# Patient Record
Sex: MALE | Race: WHITE | NOT HISPANIC OR LATINO | Employment: OTHER | URBAN - METROPOLITAN AREA
[De-identification: names, ages, dates, MRNs, and addresses within clinical notes are randomized per-mention and may not be internally consistent; named-entity substitution may affect disease eponyms.]

---

## 2024-10-24 ENCOUNTER — OFFICE VISIT (OUTPATIENT)
Dept: FAMILY MEDICINE CLINIC | Facility: CLINIC | Age: 79
End: 2024-10-24
Payer: MEDICARE

## 2024-10-24 VITALS
HEIGHT: 73 IN | TEMPERATURE: 98 F | BODY MASS INDEX: 30.46 KG/M2 | RESPIRATION RATE: 20 BRPM | WEIGHT: 229.8 LBS | SYSTOLIC BLOOD PRESSURE: 118 MMHG | HEART RATE: 68 BPM | OXYGEN SATURATION: 99 % | DIASTOLIC BLOOD PRESSURE: 60 MMHG

## 2024-10-24 DIAGNOSIS — F51.04 CHRONIC INSOMNIA: ICD-10-CM

## 2024-10-24 DIAGNOSIS — I42.9 CARDIOMYOPATHY, UNSPECIFIED TYPE: ICD-10-CM

## 2024-10-24 DIAGNOSIS — G89.29 CHRONIC PAIN OF BOTH SHOULDERS: ICD-10-CM

## 2024-10-24 DIAGNOSIS — M25.511 CHRONIC PAIN OF BOTH SHOULDERS: ICD-10-CM

## 2024-10-24 DIAGNOSIS — I48.11 LONGSTANDING PERSISTENT ATRIAL FIBRILLATION: ICD-10-CM

## 2024-10-24 DIAGNOSIS — R35.1 BPH ASSOCIATED WITH NOCTURIA: Primary | ICD-10-CM

## 2024-10-24 DIAGNOSIS — M25.512 CHRONIC PAIN OF BOTH SHOULDERS: ICD-10-CM

## 2024-10-24 DIAGNOSIS — E78.5 HYPERLIPIDEMIA, UNSPECIFIED HYPERLIPIDEMIA TYPE: ICD-10-CM

## 2024-10-24 DIAGNOSIS — N40.1 BPH ASSOCIATED WITH NOCTURIA: Primary | ICD-10-CM

## 2024-10-24 PROBLEM — R97.20 BPH WITH ELEVATED PSA AND LOWER URINARY TRACT SYMPTOMS: Status: ACTIVE | Noted: 2024-10-24

## 2024-10-24 PROCEDURE — G2211 COMPLEX E/M VISIT ADD ON: HCPCS | Performed by: FAMILY MEDICINE

## 2024-10-24 PROCEDURE — 99205 OFFICE O/P NEW HI 60 MIN: CPT | Performed by: FAMILY MEDICINE

## 2024-10-24 RX ORDER — ATORVASTATIN CALCIUM 40 MG/1
1 TABLET, FILM COATED ORAL DAILY
COMMUNITY
Start: 2024-06-01

## 2024-10-24 RX ORDER — MIRTAZAPINE 15 MG/1
15 TABLET, ORALLY DISINTEGRATING ORAL NIGHTLY
Qty: 30 TABLET | Refills: 5 | Status: SHIPPED | OUTPATIENT
Start: 2024-10-24

## 2024-10-24 RX ORDER — TAMSULOSIN HYDROCHLORIDE 0.4 MG/1
1 CAPSULE ORAL DAILY
Qty: 30 CAPSULE | Refills: 11 | Status: SHIPPED | OUTPATIENT
Start: 2024-10-24

## 2024-10-24 RX ORDER — HYDROCODONE BITARTRATE AND ACETAMINOPHEN 5; 325 MG/1; MG/1
TABLET ORAL
COMMUNITY
Start: 2024-05-29 | End: 2024-10-24

## 2024-10-24 RX ORDER — TRAZODONE HYDROCHLORIDE 50 MG/1
100 TABLET, FILM COATED ORAL NIGHTLY
COMMUNITY
End: 2024-10-24 | Stop reason: SDUPTHER

## 2024-10-24 RX ORDER — BUMETANIDE 1 MG/1
1 TABLET ORAL 2 TIMES DAILY
COMMUNITY
End: 2024-10-24 | Stop reason: SDUPTHER

## 2024-10-24 RX ORDER — AMIODARONE HYDROCHLORIDE 200 MG/1
200 TABLET ORAL 2 TIMES DAILY
COMMUNITY
End: 2024-10-24

## 2024-10-24 RX ORDER — BUMETANIDE 1 MG/1
2 TABLET ORAL 2 TIMES DAILY PRN
COMMUNITY
Start: 2024-09-09 | End: 2024-10-24

## 2024-10-24 RX ORDER — METOLAZONE 10 MG/1
10 TABLET ORAL DAILY
Qty: 30 TABLET | Refills: 5 | Status: SHIPPED | OUTPATIENT
Start: 2024-10-24

## 2024-10-24 RX ORDER — TAMSULOSIN HYDROCHLORIDE 0.4 MG/1
1 CAPSULE ORAL DAILY
COMMUNITY
End: 2024-10-24 | Stop reason: SDUPTHER

## 2024-10-24 RX ORDER — TRAZODONE HYDROCHLORIDE 50 MG/1
100 TABLET, FILM COATED ORAL NIGHTLY
Qty: 60 TABLET | Refills: 5 | Status: SHIPPED | OUTPATIENT
Start: 2024-10-24

## 2024-10-24 RX ORDER — POTASSIUM CHLORIDE 1500 MG/1
20 TABLET, EXTENDED RELEASE ORAL DAILY
Qty: 30 TABLET | Refills: 5 | Status: SHIPPED | OUTPATIENT
Start: 2024-10-24

## 2024-10-24 RX ORDER — APIXABAN 5 MG/1
1 TABLET, FILM COATED ORAL 2 TIMES DAILY
COMMUNITY
Start: 2024-06-01

## 2024-10-24 RX ORDER — POTASSIUM CHLORIDE 1500 MG/1
20 TABLET, EXTENDED RELEASE ORAL DAILY
COMMUNITY
End: 2024-10-24 | Stop reason: SDUPTHER

## 2024-10-24 RX ORDER — METOLAZONE 10 MG/1
10 TABLET ORAL DAILY
COMMUNITY
End: 2024-10-24 | Stop reason: SDUPTHER

## 2024-10-24 RX ORDER — MIRTAZAPINE 15 MG/1
15 TABLET, ORALLY DISINTEGRATING ORAL NIGHTLY
COMMUNITY
End: 2024-10-24 | Stop reason: SDUPTHER

## 2024-10-24 RX ORDER — BUMETANIDE 1 MG/1
1 TABLET ORAL 2 TIMES DAILY
Qty: 60 TABLET | Refills: 5 | Status: SHIPPED | OUTPATIENT
Start: 2024-10-24

## 2024-10-24 NOTE — ASSESSMENT & PLAN NOTE
Longstanding.  Stable with tamsulosin.  Much of his nocturia is due to late evening use of diuretics.  Nocturia interferes with sleep.  We discussed setting reminders and his phone so that he may use diuretics in the morning

## 2024-10-24 NOTE — ASSESSMENT & PLAN NOTE
Poorly controlled.  Long history of poor sleep habits.  I recommend he begin taking his mirtazapine and trazodone at 10 PM with a goal of being in bed and falling asleep by 12 PM.  This should also reduce the morning grogginess the medicines because

## 2024-10-24 NOTE — PROGRESS NOTES
"Chief Complaint   Patient presents with    NP / establish PCP        Subjective      Raphael Hernandez is a 78 y.o. who presents for Chronic Care and to establish care.  Chronic conditions that I will manage her comanage are listed in his assessment and plan.      Patient has been erroneously marked as diabetic. Based on the available clinical information, he does not have diabetes and should therefore be excluded from diabetic health maintenance and quality measures for the remainder of the reporting period.    The following portions of the patient's history were reviewed and updated as appropriate: allergies, current medications, past family history, past medical history, past social history, past surgical history, and problem list.    Review of Systems    Objective   Vital Signs:  /60   Pulse 68   Temp 98 °F (36.7 °C)   Resp 20   Ht 185.4 cm (73\")   Wt 104 kg (229 lb 12.8 oz)   SpO2 99%   BMI 30.32 kg/m²     BMI is >= 30 and <35. (Class 1 Obesity). The following options were offered after discussion;: Information on healthy weight added to patient's after visit summary.        Physical Exam  Constitutional:       Appearance: Normal appearance.      Comments: Patient is in a wheelchair   HENT:      Head: Normocephalic.      Right Ear: External ear normal.      Left Ear: External ear normal.   Cardiovascular:      Rate and Rhythm: Normal rate. Rhythm irregular.   Pulmonary:      Effort: Pulmonary effort is normal.      Breath sounds: Normal breath sounds.   Musculoskeletal:      Cervical back: Normal range of motion.      Right lower leg: Edema present.      Left lower leg: Edema present.   Neurological:      Mental Status: He is alert and oriented to person, place, and time.          Result Review   The following data was reviewed by: Hung Dick MD on 10/24/2024:    Data reviewed : Labs from September 2024 including CMP and CBC               Assessment and Plan  Diagnoses and all orders for " this visit:    1. BPH associated with nocturia (Primary)  Assessment & Plan:  Longstanding.  Stable with tamsulosin.  Much of his nocturia is due to late evening use of diuretics.  Nocturia interferes with sleep.  We discussed setting reminders and his phone so that he may use diuretics in the morning    Orders:  -     tamsulosin (FLOMAX) 0.4 MG capsule 24 hr capsule; Take 1 capsule by mouth Daily.  Dispense: 30 capsule; Refill: 11    2. Chronic insomnia  Assessment & Plan:  Poorly controlled.  Long history of poor sleep habits.  I recommend he begin taking his mirtazapine and trazodone at 10 PM with a goal of being in bed and falling asleep by 12 PM.  This should also reduce the morning grogginess the medicines because    Orders:  -     mirtazapine (REMERON SOL-TAB) 15 MG disintegrating tablet; Place 1 tablet on the tongue Every Night.  Dispense: 30 tablet; Refill: 5  -     traZODone (DESYREL) 50 MG tablet; Take 2 tablets by mouth Every Night.  Dispense: 60 tablet; Refill: 5    3. Hyperlipidemia, unspecified hyperlipidemia type  Assessment & Plan:   Stable.  Continue lifetime statin therapy      4. Cardiomyopathy, unspecified type  Assessment & Plan:  Stable.  Continue regular cardiology follow-up with Dr. Oconnor.  Last EF 35%.  Currently not compliant with diuretic regimen which she admits sometimes forgetting to take altogether or just taking an evening dose of Bumex which contributes to nocturia.  We discussed using a smart phone to set reminders        Orders:  -     bumetanide (BUMEX) 1 MG tablet; Take 1 tablet by mouth 2 (Two) Times a Day.  Dispense: 60 tablet; Refill: 5  -     metOLazone (ZAROXOLYN) 10 MG tablet; Take 1 tablet by mouth Daily.  Dispense: 30 tablet; Refill: 5  -     potassium chloride (KLOR-CON M20) 20 MEQ CR tablet; Take 1 tablet by mouth Daily.  Dispense: 30 tablet; Refill: 5    5. Longstanding persistent atrial fibrillation  Assessment & Plan:  Rate controlled in office today.  Continue  management through local cardiology, Dr. Shamar Oconnor.      6. Chronic pain of both shoulders  Assessment & Plan:  Stable.  Continue as needed analgesics.  We will contact his mail-order pharmacy to clarify whether he has been using high-dose ibuprofen or acetaminophen for this          I spent 68 minutes caring for Raphael on this date of service. This time includes time spent by me in the following activities:reviewing tests, performing a medically appropriate examination and/or evaluation , counseling and educating the patient/family/caregiver, and documenting information in the medical record    Follow Up  No follow-ups on file.  Patient was given instructions and counseling regarding his condition or for health maintenance advice. Please see specific information pulled into the AVS if appropriate.

## 2024-10-24 NOTE — ASSESSMENT & PLAN NOTE
Rate controlled in office today.  Continue management through local cardiology, Dr. Shamar Oconnor.

## 2024-10-24 NOTE — ASSESSMENT & PLAN NOTE
Stable.  Continue as needed analgesics.  We will contact his mail-order pharmacy to clarify whether he has been using high-dose ibuprofen or acetaminophen for this

## 2024-10-24 NOTE — ASSESSMENT & PLAN NOTE
Stable.  Continue regular cardiology follow-up with Dr. Oconnor.  Last EF 35%.  Currently not compliant with diuretic regimen which she admits sometimes forgetting to take altogether or just taking an evening dose of Bumex which contributes to nocturia.  We discussed using a smart phone to set reminders

## 2024-12-10 RX ORDER — ATORVASTATIN CALCIUM 40 MG/1
40 TABLET, FILM COATED ORAL DAILY
Qty: 90 TABLET | Refills: 3 | Status: SHIPPED | OUTPATIENT
Start: 2024-12-10

## 2024-12-10 NOTE — TELEPHONE ENCOUNTER
Caller: Raphael Hernandez    Relationship: Self    Best call back numbe     Requested Prescriptions:   Requested Prescriptions     Pending Prescriptions Disp Refills    atorvastatin (LIPITOR) 40 MG tablet 90 tablet      Sig: Take 1 tablet by mouth Daily.        Pharmacy where request should be sent: [facility] - [facility] [address] - [phone]  - [phone] FX     Last office visit with prescribing clinician: 10/24/2024   Last telemedicine visit with prescribing clinician: Visit date not found   Next office visit with prescribing clinician: Visit date not found     Additional details provided by patient: PATIENT ADVISED THAT LUIS TORRES IS NEEDING A NEW PRESCRIPTION AS THIS WAS WRITTEN BY HIS PREVIOUS PROVIDER; HE IS NOT OUT OF MEDICATION    Does the patient have less than a 3 day supply:  [] Yes  [x] No    Would you like a call back once the refill request has been completed: [] Yes [] No    If the office needs to give you a call back, can they leave a voicemail: [] Yes [] No    Kiersten Simpson Rep   12/10/24 11:15 EST

## 2025-01-02 DIAGNOSIS — I48.11 LONGSTANDING PERSISTENT ATRIAL FIBRILLATION: Primary | ICD-10-CM

## 2025-01-02 NOTE — TELEPHONE ENCOUNTER
Caller: Pedro Hernandezdanay CORTES    Relationship: Self    Best call back number: [phone]     Requested Prescriptions:   Requested Prescriptions     Pending Prescriptions Disp Refills    Eliquis 5 MG tablet tablet 60 tablet      Sig: Take 1 tablet by mouth 2 (Two) Times a Day.        Pharm[facility] where request should be sent[facility] [place] [address] [facility] [phone]  - [phone] FX     Last office visit with prescribing clinician: 10/24/2024   Last telemedicine visit with prescribing clinician: Visit date not found   Next office visit with prescribing clinician: Visit date not found     Additional details provided by patient: PATIENT HAS 5 DAYS LEFT. HE WOULD LIKE A 90 DAY SUPPLY IF POSSIBLE.     Does the patient have less than a 3 day supply:  [] Yes  [x] No    Would you like a call back once the refill request has been completed: [] Yes [x] No    If the office needs to give you a call back, can they leave a voicemail: [] Yes [x] No    Kiersten Rosenberg Rep   01/02/25 16:00 EST

## 2025-01-03 RX ORDER — APIXABAN 5 MG/1
5 TABLET, FILM COATED ORAL 2 TIMES DAILY
Qty: 60 TABLET | Refills: 4 | Status: SHIPPED | OUTPATIENT
Start: 2025-01-03

## 2025-01-07 ENCOUNTER — TELEPHONE (OUTPATIENT)
Dept: FAMILY MEDICINE CLINIC | Facility: CLINIC | Age: 80
End: 2025-01-07
Payer: MEDICARE

## 2025-03-04 ENCOUNTER — OFFICE VISIT (OUTPATIENT)
Dept: FAMILY MEDICINE CLINIC | Facility: CLINIC | Age: 80
End: 2025-03-04
Payer: MEDICARE

## 2025-03-04 VITALS
WEIGHT: 234 LBS | RESPIRATION RATE: 18 BRPM | HEIGHT: 73 IN | BODY MASS INDEX: 31.01 KG/M2 | TEMPERATURE: 97.5 F | SYSTOLIC BLOOD PRESSURE: 118 MMHG | OXYGEN SATURATION: 98 % | HEART RATE: 82 BPM | DIASTOLIC BLOOD PRESSURE: 62 MMHG

## 2025-03-04 DIAGNOSIS — F51.04 CHRONIC INSOMNIA: ICD-10-CM

## 2025-03-04 DIAGNOSIS — I89.0 LYMPHEDEMA DUE TO VENOUS DISEASE: ICD-10-CM

## 2025-03-04 DIAGNOSIS — I99.9 LYMPHEDEMA DUE TO VENOUS DISEASE: ICD-10-CM

## 2025-03-04 DIAGNOSIS — I87.2 CHRONIC VENOUS STASIS DERMATITIS: Primary | ICD-10-CM

## 2025-03-04 DIAGNOSIS — Q80.9 ICHTHYOSIS: ICD-10-CM

## 2025-03-04 RX ORDER — AMMONIUM LACTATE 12 G/100G
1 CREAM TOPICAL AS NEEDED
Qty: 385 G | Refills: 0 | Status: SHIPPED | OUTPATIENT
Start: 2025-03-04

## 2025-03-04 RX ORDER — SPIRONOLACTONE 25 MG/1
1 TABLET ORAL DAILY
COMMUNITY
Start: 2025-02-25

## 2025-03-04 NOTE — PROGRESS NOTES
"Chief Complaint   Patient presents with    Skin Issue     Legs have been flaking, x4-5mo. Tried several different topicals.        Subjective      Raphaelcortez Hernandez is a 79 y.o. who presents for ongoing skin issues of the lower extremities for the last 4 to 5 months.  Patient reports a skin becomes very dry and flaky.  Large flakes of skin will fall off his legs and get into his carpet and eddie.  He will use moisturizing creams or lotions with improvement but when he stops using them since the dry skin will return.    Objective   Vital Signs:  /62   Pulse 82   Temp 97.5 °F (36.4 °C)   Resp 18   Ht 185.4 cm (73\")   Wt 106 kg (234 lb)   SpO2 98%   BMI 30.87 kg/m²     Physical Exam  Vitals reviewed.   Skin:     Comments: Bilateral lower extremities have pink nodular skin from the mid shin down to the feet.  Skin is edematous.  On the lateral aspects of the lower legs there is dry cracked skin that is superficial and adherent.  He does not have any open wounds          Result Review                     Assessment and Plan  Diagnoses and all orders for this visit:    1. Chronic venous stasis dermatitis (Primary)  -     ammonium lactate (AMLACTIN) 12 % cream; Apply 1 Application topically to the appropriate area as directed As Needed for Dry Skin.  Dispense: 385 g; Refill: 0    2. Ichthyosis  -     ammonium lactate (AMLACTIN) 12 % cream; Apply 1 Application topically to the appropriate area as directed As Needed for Dry Skin.  Dispense: 385 g; Refill: 0    3. Lymphedema due to venous disease  -     Ambulatory Referral to Physical Therapy for Evaluation & Treatment    4. Chronic insomnia    Plan  1.  Continue to use moisturizing cream on bilateral lower extremities.  2.  Use ammonium lactate on thickened dry flaky skin  3.  Refer to [facility] physical therapy for lymphedema treatment  4.  It was explained to the patient that this will be a chronic and recurring condition as long as the " lymphedema persists  5.  Patient brought up chronic insomnia estimating 4 hours of sleep per night despite use of mirtazapine and trazodone on a limited as needed basis.  We reviewed sleep hygiene        Follow Up  No follow-ups on file.  Patient was given instructions and counseling regarding his condition or for health maintenance advice. Please see specific information pulled into the AVS if appropriate.

## 2025-03-19 ENCOUNTER — TELEPHONE (OUTPATIENT)
Dept: FAMILY MEDICINE CLINIC | Facility: CLINIC | Age: 80
End: 2025-03-19
Payer: MEDICARE

## 2025-03-19 NOTE — TELEPHONE ENCOUNTER
Called pt for more information to see why an antibiotic cream is now being requested. He stated, that he has struck the back of his LLE on his wheelchair, which he does quite often and it's cracked open and draining a clear liquid. Since this always happened often, Dr. Rinaldi gave him a tube of this to keep and use for a few days when it would happen.

## 2025-03-19 NOTE — TELEPHONE ENCOUNTER
Caller: Raphael Hernandez    Relationship: Self    Best call back number: [phone]     What medication are you requesting: MUPIROCIN OINTMENT 2%    What are your current symptoms: SKIN CONDITION ON LEG    How long have you been experiencing symptoms: 6 MONTHS    Have you had these symptoms before:    [x] Yes  [] No    Have you been treated for these symptoms before:   [x] Yes  [] No    If a prescription is needed, what is your preferred pharmacy and phone number:  [facility]    Additional notes: PATIENT WAS TREATED BY DOCTOR OLIVO FOR THE SKIN CONDITION.

## 2025-03-20 RX ORDER — MUPIROCIN 20 MG/G
1 OINTMENT TOPICAL 2 TIMES DAILY
Qty: 15 G | Refills: 0 | Status: SHIPPED | OUTPATIENT
Start: 2025-03-20 | End: 2025-03-25

## 2025-03-24 DIAGNOSIS — I48.11 LONGSTANDING PERSISTENT ATRIAL FIBRILLATION: ICD-10-CM

## 2025-03-24 RX ORDER — APIXABAN 5 MG/1
5 TABLET, FILM COATED ORAL 2 TIMES DAILY
Qty: 60 TABLET | Refills: 4 | Status: SHIPPED | OUTPATIENT
Start: 2025-03-24

## 2025-05-23 ENCOUNTER — OFFICE VISIT (OUTPATIENT)
Dept: FAMILY MEDICINE CLINIC | Facility: CLINIC | Age: 80
End: 2025-05-23
Payer: MEDICARE

## 2025-05-23 VITALS
HEIGHT: 73 IN | HEART RATE: 70 BPM | DIASTOLIC BLOOD PRESSURE: 64 MMHG | TEMPERATURE: 97.7 F | BODY MASS INDEX: 30.32 KG/M2 | WEIGHT: 228.8 LBS | SYSTOLIC BLOOD PRESSURE: 100 MMHG | RESPIRATION RATE: 14 BRPM | OXYGEN SATURATION: 93 %

## 2025-05-23 DIAGNOSIS — I89.0 LYMPHEDEMA DUE TO VENOUS INSUFFICIENCY: ICD-10-CM

## 2025-05-23 DIAGNOSIS — N40.1 BPH ASSOCIATED WITH NOCTURIA: ICD-10-CM

## 2025-05-23 DIAGNOSIS — R39.15 URINARY URGENCY: ICD-10-CM

## 2025-05-23 DIAGNOSIS — I48.11 LONGSTANDING PERSISTENT ATRIAL FIBRILLATION: ICD-10-CM

## 2025-05-23 DIAGNOSIS — Z00.00 MEDICARE ANNUAL WELLNESS VISIT, SUBSEQUENT: Primary | ICD-10-CM

## 2025-05-23 DIAGNOSIS — E78.5 HYPERLIPIDEMIA, UNSPECIFIED HYPERLIPIDEMIA TYPE: ICD-10-CM

## 2025-05-23 DIAGNOSIS — R35.1 BPH ASSOCIATED WITH NOCTURIA: ICD-10-CM

## 2025-05-23 DIAGNOSIS — L89.159 PRESSURE INJURY OF SKIN OF SACRAL REGION, UNSPECIFIED INJURY STAGE: ICD-10-CM

## 2025-05-23 DIAGNOSIS — I42.9 CARDIOMYOPATHY, UNSPECIFIED TYPE: ICD-10-CM

## 2025-05-23 DIAGNOSIS — Z00.00 ANNUAL PHYSICAL EXAM: ICD-10-CM

## 2025-05-23 DIAGNOSIS — I87.2 LYMPHEDEMA DUE TO VENOUS INSUFFICIENCY: ICD-10-CM

## 2025-05-23 PROBLEM — Z79.899 LONG TERM CURRENT USE OF DIURETIC: Status: ACTIVE | Noted: 2025-05-23

## 2025-05-23 PROCEDURE — 1160F RVW MEDS BY RX/DR IN RCRD: CPT | Performed by: FAMILY MEDICINE

## 2025-05-23 PROCEDURE — 1159F MED LIST DOCD IN RCRD: CPT | Performed by: FAMILY MEDICINE

## 2025-05-23 RX ORDER — OXYBUTYNIN CHLORIDE 10 MG/1
10 TABLET, EXTENDED RELEASE ORAL DAILY
Qty: 30 TABLET | Refills: 5 | Status: SHIPPED | OUTPATIENT
Start: 2025-05-23

## 2025-05-23 RX ORDER — SENNOSIDES 8.6 MG
650 CAPSULE ORAL EVERY 8 HOURS PRN
Qty: 90 TABLET | Refills: 2 | Status: SHIPPED | OUTPATIENT
Start: 2025-05-23

## 2025-05-23 NOTE — ASSESSMENT & PLAN NOTE
Orders:    Comprehensive Metabolic Panel    CBC (No Diff)     -- resume ezetimibe outpatient. Holding inpatient.

## 2025-05-23 NOTE — PROGRESS NOTES
Subjective   The ABCs of the Annual Wellness Visit  Medicare Wellness Visit      Raphael Hernandez is a 79 y.o. patient who presents for a Medicare Wellness Visit.    The following portions of the patient's history were reviewed and   updated as appropriate: allergies, current medications, past family history, past medical history, past social history, past surgical history, and problem list.    Compared to one year ago, the patient's physical   health is the same.  Compared to one year ago, the patient's mental   health is better.    Recent Hospitalizations:  He was not admitted to the hospital during the last year.     Current Medical Providers:  Patient Care Team:  Hung Dick MD as PCP - General (Family Medicine)  Case, Zhen Beaulieu MD as Consulting Physician (Internal Medicine)  Moshe Oconnor MD (Cardiology)    Outpatient Medications Prior to Visit   Medication Sig Dispense Refill    ammonium lactate (AMLACTIN) 12 % cream Apply 1 Application topically to the appropriate area as directed As Needed for Dry Skin. 385 g 0    atorvastatin (LIPITOR) 40 MG tablet Take 1 tablet by mouth Daily. 90 tablet 3    bumetanide (BUMEX) 1 MG tablet Take 1 tablet by mouth 2 (Two) Times a Day. 60 tablet 5    Eliquis 5 MG tablet tablet TAKE ONE TABLET BY MOUTH TWICE DAILY 60 tablet 4    metOLazone (ZAROXOLYN) 10 MG tablet Take 1 tablet by mouth Daily. 30 tablet 5    metoprolol tartrate (LOPRESSOR) 12.5 MG half tablet Take 2 half tablet by mouth 2 (Two) Times a Day.      mirtazapine (REMERON SOL-TAB) 15 MG disintegrating tablet Place 1 tablet on the tongue Every Night. 30 tablet 5    potassium chloride (KLOR-CON M20) 20 MEQ CR tablet Take 1 tablet by mouth Daily. 30 tablet 5    tamsulosin (FLOMAX) 0.4 MG capsule 24 hr capsule Take 1 capsule by mouth Daily. 30 capsule 11    traZODone (DESYREL) 50 MG tablet Take 2 tablets by mouth Every Night. 60 tablet 5    spironolactone (ALDACTONE) 25 MG tablet Take 1 tablet  "by mouth Daily.       No facility-administered medications prior to visit.     No opioid medication identified on active medication list. I have reviewed chart for other potential  high risk medication/s and harmful drug interactions in the elderly.      Aspirin is not on active medication list.  Aspirin use is not indicated based on review of current medical condition/s. Risk of harm outweighs potential benefits.  .    Patient Active Problem List   Diagnosis    Longstanding persistent atrial fibrillation    Cardiomyopathy    Hyperlipidemia    BPH associated with nocturia    Chronic insomnia    Chronic pain of both shoulders    Lymphedema due to venous insufficiency    Long term current use of diuretic     Advance Care Planning Advance Directive is not on file.  ACP discussion was held with the patient during this visit. Patient has an advance directive (not in EMR), copy requested.            Objective   Vitals:    05/23/25 1014   BP: 100/64   Pulse: 70   Resp: 14   Temp: 97.7 °F (36.5 °C)   SpO2: 93%   Weight: 104 kg (228 lb 12.8 oz)   Height: 185.4 cm (73\")   PainSc: 0-No pain       Estimated body mass index is 30.19 kg/m² as calculated from the following:    Height as of this encounter: 185.4 cm (73\").    Weight as of this encounter: 104 kg (228 lb 12.8 oz).                Does the patient have evidence of cognitive impairment? No                                                                                                Health  Risk Assessment    Smoking Status:  Social History     Tobacco Use   Smoking Status Never   Smokeless Tobacco Never     Alcohol Consumption:  Social History     Substance and Sexual Activity   Alcohol Use Yes       Fall Risk Screen  STEADI Fall Risk Assessment was completed, and patient is at HIGH risk for falls. Assessment completed on:5/23/2025    Depression Screening   Little interest or pleasure in doing things? Not at all   Feeling down, depressed, or hopeless? Not at all   PHQ-2 " Total Score 0      Health Habits and Functional and Cognitive Screenin/23/2025    11:33 AM   Functional & Cognitive Status   Do you have difficulty preparing food and eating? No   Do you have difficulty bathing yourself, getting dressed or grooming yourself? No   Do you have difficulty using the toilet? No   Do you have difficulty moving around from place to place? No   Do you have trouble with steps or getting out of a bed or a chair? No   Current Diet Limited Junk Food   Dental Exam Up to date   Eye Exam Up to date   Exercise (times per week) 3 times per week   Current Exercises Include Other   Do you need help using the phone?  No   Are you deaf or do you have serious difficulty hearing?  No   Do you need help to go to places out of walking distance? Yes   Do you need help shopping? No   Do you need help preparing meals?  No   Do you need help with housework?  No   Do you need help with laundry? No   Do you need help taking your medications? No   Do you need help managing money? No   Do you ever drive or ride in a car without wearing a seat belt? No   Have you felt unusual stress, anger or loneliness in the last month? No   Who do you live with? Alone   If you need help, do you have trouble finding someone available to you? No   Have you been bothered in the last four weeks by sexual problems? No   Do you have difficulty concentrating, remembering or making decisions? No           Age-appropriate Screening Schedule:  Refer to the list below for future screening recommendations based on patient's age, sex and/or medical conditions. Orders for these recommended tests are listed in the plan section. The patient has been provided with a written plan.    Health Maintenance List  Health Maintenance   Topic Date Due    LIPID PANEL  Never done    DIABETIC FOOT EXAM  Never done    DIABETIC EYE EXAM  Never done    URINE MICROALBUMIN-CREATININE RATIO (uACR)  Never done    Pneumococcal Vaccine 50+ (1 of 2 - PCV)  "Never done    ZOSTER VACCINE (1 of 2) Never done    RSV Vaccine - Adults (1 - 1-dose 75+ series) Never done    HEMOGLOBIN A1C  Never done    COVID-19 Vaccine (3 - 2024-25 season) 06/06/2025 (Originally 9/1/2024)    INFLUENZA VACCINE  07/01/2025    ANNUAL WELLNESS VISIT  05/23/2026    TDAP/TD VACCINES (2 - Tdap) 06/07/2032    HEPATITIS C SCREENING  Completed                                                                                                                                                CMS Preventative Services Quick Reference  Risk Factors Identified During Encounter  Fall Risk-High or Moderate: Discussed Fall Prevention in the home  Immunizations Discussed/Encouraged: Influenza  Urinary Incontinence:  trial of ditropan  F/U Cardiology every 6-12 months    The above risks/problems have been discussed with the patient.  Pertinent information has been shared with the patient in the After Visit Summary.  An After Visit Summary and PPPS were made available to the patient.    Follow Up:   Next Medicare Wellness visit to be scheduled in 1 year.         Additional E&M Note during same encounter follows:  Patient has additional, significant, and separately identifiable condition(s)/problem(s) that require work above and beyond the Medicare Wellness Visit     Chief Complaint  Medicare Wellness-subsequent    Subjective   HPI  Raphael is also being seen today for an annual adult preventative physical exam.     Review of Systems   HENT:  Positive for voice change.    Cardiovascular:  Positive for leg swelling.   Genitourinary:  Positive for urgency.   Musculoskeletal:  Positive for arthralgias and gait problem.   Skin:  Positive for wound.              Objective   Vital Signs:  /64   Pulse 70   Temp 97.7 °F (36.5 °C)   Resp 14   Ht 185.4 cm (73\")   Wt 104 kg (228 lb 12.8 oz)   SpO2 93%   BMI 30.19 kg/m²   Physical Exam  Constitutional:       General: He is not in acute distress.     Appearance: Normal " appearance. He is not ill-appearing.      Comments: He is in a wheelchair   HENT:      Head: Normocephalic and atraumatic.      Right Ear: Tympanic membrane and ear canal normal.      Left Ear: Tympanic membrane and ear canal normal.      Nose: Nose normal.      Mouth/Throat:      Mouth: Mucous membranes are moist.      Pharynx: Oropharynx is clear. No posterior oropharyngeal erythema.   Eyes:      Extraocular Movements: Extraocular movements intact.      Conjunctiva/sclera: Conjunctivae normal.      Pupils: Pupils are equal, round, and reactive to light.   Cardiovascular:      Rate and Rhythm: Normal rate and regular rhythm.      Pulses: Normal pulses.      Heart sounds: Normal heart sounds. No murmur heard.     Comments: Bilateral lower extremity edema from lymphedema.  Skin is dry and flaky.  Serous weeping from the posterior left leg  Pulmonary:      Effort: Pulmonary effort is normal. No respiratory distress.      Breath sounds: Normal breath sounds.   Abdominal:      General: Abdomen is flat. Bowel sounds are normal.      Palpations: Abdomen is soft.      Tenderness: There is no abdominal tenderness.   Musculoskeletal:         General: Normal range of motion.      Cervical back: Normal range of motion and neck supple. No tenderness.      Right lower leg: Edema present.      Left lower leg: Edema present.   Lymphadenopathy:      Cervical: No cervical adenopathy.   Skin:     General: Skin is warm and dry.      Capillary Refill: Capillary refill takes less than 2 seconds.   Neurological:      General: No focal deficit present.      Mental Status: He is alert and oriented to person, place, and time. Mental status is at baseline.      Cranial Nerves: No cranial nerve deficit.      Sensory: No sensory deficit.      Motor: No weakness.   Psychiatric:         Mood and Affect: Mood normal.         Behavior: Behavior normal.         Thought Content: Thought content normal.         Judgment: Judgment normal.                     Assessment and Plan      Medicare annual wellness visit, subsequent         Annual physical exam         Longstanding persistent atrial fibrillation    Orders:    Comprehensive Metabolic Panel    CBC (No Diff)    Cardiomyopathy, unspecified type          Orders:    Comprehensive Metabolic Panel    CBC (No Diff)    Hyperlipidemia, unspecified hyperlipidemia type       Orders:    Comprehensive Metabolic Panel    Lipid Panel    Lymphedema due to venous insufficiency    Orders:    Ambulatory Referral to Physical Therapy for Evaluation & Treatment    BPH associated with nocturia         Urinary urgency    Orders:    oxybutynin XL (Ditropan XL) 10 MG 24 hr tablet; Take 1 tablet by mouth Daily.    Pressure injury of skin of sacral region, unspecified injury stage    Orders:    Zinc Oxide 12 % cream; Apply 1 Application topically 2 (Two) Times a Day.  Plan  1.  Patient's lymphedema has improved slightly but he will be referred to the lymphedema clinic locally.  2.  Patient has a sacral wound which he declined evaluation for but would prefer like a barrier cream and was given Barse  3.  Patient reports urinary urgency which is very likely contributed to by his multiple diuretics.  Oxybutynin can be used as a trial although I did discuss with the patient whether or not he needs all 3 diuretics.  He has follow-up with cardiology next week to assess.  4.  Patient was encouraged to remain as active as possible  5.  Surveillance labs have been ordered          Follow Up   No follow-ups on file.  Patient was given instructions and counseling regarding his condition or for health maintenance advice. Please see specific information pulled into the AVS if appropriate.

## 2025-05-24 ENCOUNTER — DOCUMENTATION (OUTPATIENT)
Dept: FAMILY MEDICINE CLINIC | Facility: CLINIC | Age: 80
End: 2025-05-24
Payer: MEDICARE

## 2025-05-24 ENCOUNTER — NURSE TRIAGE (OUTPATIENT)
Dept: CALL CENTER | Facility: HOSPITAL | Age: 80
End: 2025-05-24
Payer: MEDICARE

## 2025-05-24 LAB
ALBUMIN SERPL-MCNC: 3.8 G/DL (ref 3.8–4.8)
ALP SERPL-CCNC: 147 IU/L (ref 44–121)
ALT SERPL-CCNC: 12 IU/L (ref 0–44)
AST SERPL-CCNC: 12 IU/L (ref 0–40)
BILIRUB SERPL-MCNC: 0.8 MG/DL (ref 0–1.2)
BUN SERPL-MCNC: 35 MG/DL (ref 8–27)
BUN/CREAT SERPL: 26 (ref 10–24)
CALCIUM SERPL-MCNC: 9.4 MG/DL (ref 8.6–10.2)
CHLORIDE SERPL-SCNC: 84 MMOL/L (ref 96–106)
CHOLEST SERPL-MCNC: 102 MG/DL (ref 100–199)
CO2 SERPL-SCNC: 24 MMOL/L (ref 20–29)
CREAT SERPL-MCNC: 1.37 MG/DL (ref 0.76–1.27)
EGFRCR SERPLBLD CKD-EPI 2021: 52 ML/MIN/1.73
ERYTHROCYTE [DISTWIDTH] IN BLOOD BY AUTOMATED COUNT: 12.5 % (ref 11.6–15.4)
GLOBULIN SER CALC-MCNC: 3 G/DL (ref 1.5–4.5)
GLUCOSE SERPL-MCNC: 632 MG/DL (ref 70–99)
HCT VFR BLD AUTO: 37.9 % (ref 37.5–51)
HDLC SERPL-MCNC: 39 MG/DL
HGB BLD-MCNC: 12.3 G/DL (ref 13–17.7)
LDLC SERPL CALC-MCNC: 43 MG/DL (ref 0–99)
MCH RBC QN AUTO: 31.9 PG (ref 26.6–33)
MCHC RBC AUTO-ENTMCNC: 32.5 G/DL (ref 31.5–35.7)
MCV RBC AUTO: 98 FL (ref 79–97)
PLATELET # BLD AUTO: 235 X10E3/UL (ref 150–450)
POTASSIUM SERPL-SCNC: 4.8 MMOL/L (ref 3.5–5.2)
PROT SERPL-MCNC: 6.8 G/DL (ref 6–8.5)
RBC # BLD AUTO: 3.86 X10E6/UL (ref 4.14–5.8)
SODIUM SERPL-SCNC: 124 MMOL/L (ref 134–144)
TRIGL SERPL-MCNC: 104 MG/DL (ref 0–149)
VLDLC SERPL CALC-MCNC: 20 MG/DL (ref 5–40)
WBC # BLD AUTO: 7.1 X10E3/UL (ref 3.4–10.8)

## 2025-05-24 NOTE — TELEPHONE ENCOUNTER
"Reason for Disposition   Lab or radiology calling with CRITICAL test results    Additional Information   Negative: Lab calling with strep throat test results and triager can call in prescription   Negative: Lab calling with urinalysis test results and triager can call in prescription   Negative: Medication questions   Negative: Medication renewal and refill questions   Negative: Pre-operative or pre-procedural questions   Negative: ED call to PCP (i.e., primary care provider; doctor, NP, or PA)   Negative: Doctor (or NP/PA) call to PCP   Negative: Call about patient who is currently hospitalized    Answer Assessment - Initial Assessment Questions  1. REASON FOR CALL or QUESTION: \"What is your reason for calling today?\" or \"How can I best  help you?\" or \"What question do you have that I can help answer?\"      Critical Lab Value  2. CALLER: Document the source of call. (e.g., laboratory, patient).      Tia, [facility]    Protocols used: PCP Call - No Triage-ADULT-    "

## 2025-05-24 NOTE — TELEPHONE ENCOUNTER
Critical Lab Value of  drawn on 05/23/25 @ 1106 reported by Brielle, [facility];  left message on Dr. Kapil GARCIA with results and call back with any questions.

## 2025-05-29 DIAGNOSIS — I42.9 CARDIOMYOPATHY, UNSPECIFIED TYPE: ICD-10-CM

## 2025-05-29 RX ORDER — POTASSIUM CHLORIDE 1500 MG/1
20 TABLET, EXTENDED RELEASE ORAL DAILY
Qty: 30 TABLET | Refills: 5 | Status: SHIPPED | OUTPATIENT
Start: 2025-05-29

## 2025-05-30 ENCOUNTER — OFFICE VISIT (OUTPATIENT)
Dept: FAMILY MEDICINE CLINIC | Facility: CLINIC | Age: 80
End: 2025-05-30
Payer: MEDICARE

## 2025-05-30 VITALS
HEIGHT: 73 IN | RESPIRATION RATE: 24 BRPM | HEART RATE: 79 BPM | BODY MASS INDEX: 30.19 KG/M2 | TEMPERATURE: 98.6 F | SYSTOLIC BLOOD PRESSURE: 110 MMHG | DIASTOLIC BLOOD PRESSURE: 60 MMHG | OXYGEN SATURATION: 99 %

## 2025-05-30 DIAGNOSIS — E11.65 TYPE 2 DIABETES MELLITUS WITH HYPERGLYCEMIA, WITHOUT LONG-TERM CURRENT USE OF INSULIN: Primary | ICD-10-CM

## 2025-05-30 PROBLEM — Z79.4 TYPE 2 DIABETES MELLITUS WITH HYPERGLYCEMIA, WITH LONG-TERM CURRENT USE OF INSULIN: Status: ACTIVE | Noted: 2025-05-30

## 2025-05-30 LAB
EXPIRATION DATE: ABNORMAL
HBA1C MFR BLD: 13.3 % (ref 4.5–5.7)
Lab: ABNORMAL

## 2025-05-30 RX ORDER — ACARBOSE 25 MG/1
25 TABLET ORAL
Qty: 90 TABLET | Refills: 0 | Status: SHIPPED | OUTPATIENT
Start: 2025-05-30

## 2025-05-30 RX ORDER — ORAL SEMAGLUTIDE 7 MG/1
7 TABLET ORAL DAILY
Qty: 30 TABLET | Refills: 1 | Status: SHIPPED | OUTPATIENT
Start: 2025-05-30

## 2025-05-30 NOTE — ASSESSMENT & PLAN NOTE
Diabetes is newly identified.   Medication changes per orders.  Recommended an ADA diet.  Nutritionist referral.  Diabetes will be reassessed 2 weeks    Patient voiced a great fear of needles and giving himself injections.    Oral hypoglycemic agents will be started and patient's tolerance for medications as well as random glucose will be checked at a 2-week follow-up visit    Patient needs to make significant dietary changes which were discussed.  This includes elimination of sugar sweetened beverages which the patient drinks large quantity of with fruit juices and soft drinks.  He needs to replace snack cakes with fruit or vegetables.  He needs to replace his protein shake with a low carbohydrate alternative.  V8 juice is likely still acceptable.  I did recommend avoidance of any fried or breaded foods.

## 2025-05-30 NOTE — PROGRESS NOTES
"No chief complaint on file.      Subjective      Raphael Hernandez is a 79 y.o. who presents for hyperglycemia with new diagnosis of diabetes.  Patient had a random glucose greater than 600 and has polyuria and polydipsia.  In the past at 1 point he describes being told his sugars were \"borderline\" but then tests supposedly returned to normal.  He has never taken medication or officially been diagnosed with diabetes as far as he is aware    The following portions of the patient's history were reviewed and updated as appropriate: allergies, current medications, past family history, past medical history, past social history, past surgical history, and problem list.    Review of Systems    Objective   Vital Signs:  /60   Pulse 79   Temp 98.6 °F (37 °C)   Resp 24   Ht 185.4 cm (73\")   SpO2 99%   BMI 30.19 kg/m²               Physical Exam  Vitals reviewed.   Constitutional:       Appearance: Normal appearance.   Neurological:      Mental Status: He is alert.          Result Review   The following data was reviewed by: Hung Dick MD on 05/30/2025:  CMP          5/23/2025    11:06   CMP   Glucose 632    BUN 35    Creatinine 1.37    EGFR 52    Sodium 124    Potassium 4.8    Chloride 84    Calcium 9.4    Total Protein 6.8    Albumin 3.8    Globulin 3.0    Total Bilirubin 0.8    Alkaline Phosphatase 147    AST (SGOT) 12    ALT (SGPT) 12    BUN/Creatinine Ratio 26      Most Recent A1C          5/30/2025    12:12   HGBA1C Most Recent   Hemoglobin A1C 13.3                    Assessment and Plan  Diagnoses and all orders for this visit:    1. Type 2 diabetes mellitus with hyperglycemia, without long-term current use of insulin (Primary)  Assessment & Plan:  Diabetes is newly identified.   Medication changes per orders.  Recommended an ADA diet.  Nutritionist referral.  Diabetes will be reassessed 2 weeks    Patient voiced a great fear of needles and giving himself injections.    Oral hypoglycemic agents will " be started and patient's tolerance for medications as well as random glucose will be checked at a 2-week follow-up visit    Patient needs to make significant dietary changes which were discussed.  This includes elimination of sugar sweetened beverages which the patient drinks large quantity of with fruit juices and soft drinks.  He needs to replace snack cakes with fruit or vegetables.  He needs to replace his protein shake with a low carbohydrate alternative.  V8 juice is likely still acceptable.  I did recommend avoidance of any fried or breaded foods.    Orders:  -     POC Glycosylated Hemoglobin (Hb A1C)  -     metFORMIN (GLUCOPHAGE) 500 MG tablet; Take 1 tablet by mouth 2 (Two) Times a Day With Meals.  Dispense: 60 tablet; Refill: 1  -     Semaglutide (Rybelsus) 7 MG tablet; Take 7 mg by mouth Daily.  Dispense: 30 tablet; Refill: 1  -     acarbose (PRECOSE) 25 MG tablet; Take 1 tablet by mouth 3 (Three) Times a Day With Meals.  Dispense: 90 tablet; Refill: 0  -     Ambulatory Referral to Nutrition Services            Follow Up  Return in about 2 weeks (around 6/13/2025).  Patient was given instructions and counseling regarding his condition or for health maintenance advice. Please see specific information pulled into the AVS if appropriate.

## 2025-06-12 ENCOUNTER — OFFICE VISIT (OUTPATIENT)
Dept: FAMILY MEDICINE CLINIC | Facility: CLINIC | Age: 80
End: 2025-06-12
Payer: MEDICARE

## 2025-06-12 VITALS
SYSTOLIC BLOOD PRESSURE: 110 MMHG | HEIGHT: 73 IN | TEMPERATURE: 98.2 F | RESPIRATION RATE: 20 BRPM | WEIGHT: 226.2 LBS | DIASTOLIC BLOOD PRESSURE: 60 MMHG | BODY MASS INDEX: 29.98 KG/M2 | HEART RATE: 80 BPM | OXYGEN SATURATION: 99 %

## 2025-06-12 DIAGNOSIS — E11.65 TYPE 2 DIABETES MELLITUS WITH HYPERGLYCEMIA, WITHOUT LONG-TERM CURRENT USE OF INSULIN: Primary | ICD-10-CM

## 2025-06-12 LAB — GLUCOSE BLDC GLUCOMTR-MCNC: 235 MG/DL (ref 70–130)

## 2025-06-12 RX ORDER — ACARBOSE 50 MG/1
50 TABLET ORAL 2 TIMES DAILY WITH MEALS
Qty: 60 TABLET | Refills: 0 | Status: SHIPPED | OUTPATIENT
Start: 2025-06-12

## 2025-06-12 NOTE — ASSESSMENT & PLAN NOTE
Diabetes control is improving.  Continue medication adjustments.    Metformin 500 mg twice daily will remain unchanged  Rybelsus will be increased to 7 mg after completing his 3 mg trial.  Patient has picked up the prescription  Acarbose will be increased to 50 mg which she will take twice daily with his breakfast shake and his evening meal.    Reassess in 4 weeks

## 2025-06-12 NOTE — PROGRESS NOTES
"Chief Complaint   Patient presents with    Follow-up    Diabetes       Subjective      Raphael Hernandez is a 79 y.o. who presents for diabetes follow-up.  Patient has tolerated metformin 500 mg twice daily, Rybelsus 3 mg daily, acarbose 25 mg which she is taking with his Lohan meal of the day.  He continues a protein shake in the morning    Objective   Vital Signs:  /60   Pulse 80   Temp 98.2 °F (36.8 °C)   Resp 20   Ht 185.4 cm (73\")   Wt 103 kg (226 lb 3.2 oz)   SpO2 99%   BMI 29.84 kg/m²     Physical Exam  Vitals reviewed.   Constitutional:       Appearance: Normal appearance.   Neurological:      Mental Status: He is alert.          Result Review   The following data was reviewed by: Hung Dick MD on 06/12/2025:    Data reviewed : Glucose 235              Assessment and Plan  Diagnoses and all orders for this visit:    1. Type 2 diabetes mellitus with hyperglycemia, without long-term current use of insulin (Primary)  Assessment & Plan:  Diabetes control is improving.  Continue medication adjustments.    Metformin 500 mg twice daily will remain unchanged  Rybelsus will be increased to 7 mg after completing his 3 mg trial.  Patient has picked up the prescription  Acarbose will be increased to 50 mg which she will take twice daily with his breakfast shake and his evening meal.    Reassess in 4 weeks    Orders:  -     POC Glucose  -     acarbose (PRECOSE) 50 MG tablet; Take 1 tablet by mouth 2 (Two) Times a Day With Meals.  Dispense: 60 tablet; Refill: 0        I spent 30 minutes caring for Raphael on this date of service. This time includes time spent by me in the following activities:preparing for the visit, reviewing tests, performing a medically appropriate examination and/or evaluation , counseling and educating the patient/family/caregiver, ordering medications, tests, or procedures, and documenting information in the medical record    Follow Up  Return in about 4 weeks (around 7/10/2025) " for Next scheduled follow up Diabetes.  Patient was given instructions and counseling regarding his condition or for health maintenance advice. Please see specific information pulled into the AVS if appropriate.

## 2025-07-14 ENCOUNTER — OFFICE VISIT (OUTPATIENT)
Dept: FAMILY MEDICINE CLINIC | Facility: CLINIC | Age: 80
End: 2025-07-14
Payer: MEDICARE

## 2025-07-14 VITALS
TEMPERATURE: 98.4 F | WEIGHT: 217.6 LBS | HEIGHT: 73 IN | BODY MASS INDEX: 28.84 KG/M2 | OXYGEN SATURATION: 98 % | DIASTOLIC BLOOD PRESSURE: 60 MMHG | SYSTOLIC BLOOD PRESSURE: 120 MMHG | RESPIRATION RATE: 20 BRPM | HEART RATE: 92 BPM

## 2025-07-14 DIAGNOSIS — E11.65 TYPE 2 DIABETES MELLITUS WITH HYPERGLYCEMIA, WITHOUT LONG-TERM CURRENT USE OF INSULIN: Primary | ICD-10-CM

## 2025-07-14 LAB
EXPIRATION DATE: ABNORMAL
HBA1C MFR BLD: 8.2 % (ref 4.5–5.7)
Lab: ABNORMAL

## 2025-07-14 PROCEDURE — 3052F HG A1C>EQUAL 8.0%<EQUAL 9.0%: CPT | Performed by: FAMILY MEDICINE

## 2025-07-14 PROCEDURE — 1126F AMNT PAIN NOTED NONE PRSNT: CPT | Performed by: FAMILY MEDICINE

## 2025-07-14 PROCEDURE — 99213 OFFICE O/P EST LOW 20 MIN: CPT | Performed by: FAMILY MEDICINE

## 2025-07-14 PROCEDURE — 83036 HEMOGLOBIN GLYCOSYLATED A1C: CPT | Performed by: FAMILY MEDICINE

## 2025-07-14 RX ORDER — ORAL SEMAGLUTIDE 7 MG/1
7 TABLET ORAL DAILY
Qty: 30 TABLET | Refills: 1 | Status: SHIPPED | OUTPATIENT
Start: 2025-07-14

## 2025-07-14 RX ORDER — ACARBOSE 50 MG/1
50 TABLET ORAL 2 TIMES DAILY WITH MEALS
Qty: 60 TABLET | Refills: 1 | Status: SHIPPED | OUTPATIENT
Start: 2025-07-14

## 2025-07-14 NOTE — ASSESSMENT & PLAN NOTE
Improving.    Continue ADA diet    Cont Metformin 500 mg bid  Cont. Acarbose 50 mg bid  Cont. Rybelsus 7 mg daily    RTO 6-7 weeks for repeat A1c   Discharged

## 2025-07-14 NOTE — PROGRESS NOTES
"Chief Complaint   Patient presents with    Follow-up    Diabetes       Subjective      Raphael Hernandez is a 79 y.o. who presents for DM f/u. Weight is down 12 lbs. He is limiting all forms of carbs. NO SE from current meds    Objective   Vital Signs:  /60   Pulse 92 Comment: irregular  Temp 98.4 °F (36.9 °C)   Resp 20   Ht 185.4 cm (73\")   Wt 98.7 kg (217 lb 9.6 oz)   SpO2 98%   BMI 28.71 kg/m²     Physical Exam  Vitals reviewed.   Constitutional:       Appearance: Normal appearance.   Neurological:      Mental Status: He is alert.          Result Review   The following data was reviewed by: Hung Dick MD on 07/14/2025:    Data reviewed : A1c 8.2   Most Recent A1C          7/14/2025    15:54   HGBA1C Most Recent   Hemoglobin A1C 8.2                 Assessment and Plan  Diagnoses and all orders for this visit:    1. Type 2 diabetes mellitus with hyperglycemia, without long-term current use of insulin (Primary)  Assessment & Plan:  Improving.    Continue ADA diet    Cont Metformin 500 mg bid  Cont. Acarbose 50 mg bid  Cont. Rybelsus 7 mg daily    RTO 6-7 weeks for repeat A1c    Orders:  -     Semaglutide (Rybelsus) 7 MG tablet; Take 7 mg by mouth Daily.  Dispense: 30 tablet; Refill: 1  -     acarbose (PRECOSE) 50 MG tablet; Take 1 tablet by mouth 2 (Two) Times a Day With Meals.  Dispense: 60 tablet; Refill: 1  -     metFORMIN (GLUCOPHAGE) 500 MG tablet; Take 1 tablet by mouth 2 (Two) Times a Day With Meals.  Dispense: 60 tablet; Refill: 1  -     POCT glycated hemoglobin, total            Follow Up  Return in about 7 weeks (around 9/2/2025).  Patient was given instructions and counseling regarding his condition or for health maintenance advice. Please see specific information pulled into the AVS if appropriate.       "

## 2025-07-29 ENCOUNTER — TELEPHONE (OUTPATIENT)
Dept: FAMILY MEDICINE CLINIC | Facility: CLINIC | Age: 80
End: 2025-07-29
Payer: MEDICARE

## 2025-07-31 ENCOUNTER — HOSPITAL ENCOUNTER (OUTPATIENT)
Dept: HOSPITAL 22 - PT | Age: 80
Discharge: HOME | End: 2025-07-31
Payer: MEDICARE

## 2025-07-31 DIAGNOSIS — I87.2: Primary | ICD-10-CM

## 2025-07-31 DIAGNOSIS — I89.0: ICD-10-CM

## 2025-07-31 PROCEDURE — 97164 PT RE-EVAL EST PLAN CARE: CPT

## 2025-07-31 PROCEDURE — 97140 MANUAL THERAPY 1/> REGIONS: CPT

## 2025-08-04 ENCOUNTER — TELEPHONE (OUTPATIENT)
Dept: FAMILY MEDICINE CLINIC | Facility: CLINIC | Age: 80
End: 2025-08-04
Payer: MEDICARE

## 2025-08-18 ENCOUNTER — OFFICE VISIT (OUTPATIENT)
Dept: FAMILY MEDICINE CLINIC | Facility: CLINIC | Age: 80
End: 2025-08-18
Payer: MEDICARE

## 2025-08-18 VITALS
WEIGHT: 216 LBS | HEIGHT: 73 IN | BODY MASS INDEX: 28.63 KG/M2 | OXYGEN SATURATION: 99 % | RESPIRATION RATE: 20 BRPM | TEMPERATURE: 98.2 F | DIASTOLIC BLOOD PRESSURE: 56 MMHG | SYSTOLIC BLOOD PRESSURE: 110 MMHG | HEART RATE: 89 BPM

## 2025-08-18 DIAGNOSIS — L08.9 WOUND INFECTION: Primary | ICD-10-CM

## 2025-08-18 DIAGNOSIS — I89.0 LYMPHEDEMA: ICD-10-CM

## 2025-08-18 DIAGNOSIS — T14.8XXA WOUND INFECTION: Primary | ICD-10-CM

## 2025-08-18 DIAGNOSIS — E11.65 TYPE 2 DIABETES MELLITUS WITH HYPERGLYCEMIA, WITHOUT LONG-TERM CURRENT USE OF INSULIN: ICD-10-CM

## 2025-08-18 LAB — GLUCOSE BLDC GLUCOMTR-MCNC: 137 MG/DL (ref 70–130)

## 2025-08-18 PROCEDURE — 82948 REAGENT STRIP/BLOOD GLUCOSE: CPT | Performed by: FAMILY MEDICINE

## 2025-08-18 PROCEDURE — 99213 OFFICE O/P EST LOW 20 MIN: CPT | Performed by: FAMILY MEDICINE

## 2025-08-18 PROCEDURE — 1126F AMNT PAIN NOTED NONE PRSNT: CPT | Performed by: FAMILY MEDICINE

## 2025-08-18 RX ORDER — CEPHALEXIN 500 MG/1
1000 CAPSULE ORAL 2 TIMES DAILY
Qty: 28 CAPSULE | Refills: 0 | Status: SHIPPED | OUTPATIENT
Start: 2025-08-18 | End: 2025-08-25

## 2025-08-25 ENCOUNTER — TELEPHONE (OUTPATIENT)
Dept: FAMILY MEDICINE CLINIC | Facility: CLINIC | Age: 80
End: 2025-08-25
Payer: MEDICARE

## 2025-08-25 DIAGNOSIS — T14.8XXA WOUND INFECTION: Primary | ICD-10-CM

## 2025-08-25 DIAGNOSIS — L08.9 WOUND INFECTION: Primary | ICD-10-CM

## 2025-08-28 ENCOUNTER — TELEPHONE (OUTPATIENT)
Dept: FAMILY MEDICINE CLINIC | Facility: CLINIC | Age: 80
End: 2025-08-28
Payer: MEDICARE

## 2025-08-28 RX ORDER — SENNOSIDES 8.6 MG
650 CAPSULE ORAL EVERY 8 HOURS PRN
Qty: 90 TABLET | Refills: 2 | Status: SHIPPED | OUTPATIENT
Start: 2025-08-28